# Patient Record
Sex: FEMALE | NOT HISPANIC OR LATINO | Employment: OTHER
[De-identification: names, ages, dates, MRNs, and addresses within clinical notes are randomized per-mention and may not be internally consistent; named-entity substitution may affect disease eponyms.]

---

## 2017-07-29 ENCOUNTER — HEALTH MAINTENANCE LETTER (OUTPATIENT)
Age: 62
End: 2017-07-29

## 2018-08-05 ENCOUNTER — HEALTH MAINTENANCE LETTER (OUTPATIENT)
Age: 63
End: 2018-08-05

## 2023-01-13 ENCOUNTER — APPOINTMENT (OUTPATIENT)
Dept: GENERAL RADIOLOGY | Facility: CLINIC | Age: 68
End: 2023-01-13
Attending: PHYSICIAN ASSISTANT
Payer: MEDICARE

## 2023-01-13 ENCOUNTER — APPOINTMENT (OUTPATIENT)
Dept: CT IMAGING | Facility: CLINIC | Age: 68
End: 2023-01-13
Attending: PHYSICIAN ASSISTANT
Payer: MEDICARE

## 2023-01-13 ENCOUNTER — HOSPITAL ENCOUNTER (OUTPATIENT)
Facility: CLINIC | Age: 68
Setting detail: OBSERVATION
Discharge: HOME | End: 2023-01-14
Attending: PHYSICIAN ASSISTANT | Admitting: STUDENT IN AN ORGANIZED HEALTH CARE EDUCATION/TRAINING PROGRAM
Payer: MEDICARE

## 2023-01-13 DIAGNOSIS — I48.91 NEW ONSET ATRIAL FIBRILLATION (H): ICD-10-CM

## 2023-01-13 DIAGNOSIS — R55 NEAR SYNCOPE: ICD-10-CM

## 2023-01-13 DIAGNOSIS — N39.0 UTI (URINARY TRACT INFECTION): ICD-10-CM

## 2023-01-13 DIAGNOSIS — N30.00 ACUTE CYSTITIS WITHOUT HEMATURIA: Primary | ICD-10-CM

## 2023-01-13 LAB
ALBUMIN SERPL BCG-MCNC: 3.6 G/DL (ref 3.5–5.2)
ALBUMIN UR-MCNC: NEGATIVE MG/DL
ALP SERPL-CCNC: 44 U/L (ref 35–104)
ALT SERPL W P-5'-P-CCNC: 25 U/L (ref 10–35)
ANION GAP SERPL CALCULATED.3IONS-SCNC: 10 MMOL/L (ref 7–15)
APPEARANCE UR: CLEAR
AST SERPL W P-5'-P-CCNC: 28 U/L (ref 10–35)
BACTERIA #/AREA URNS HPF: ABNORMAL /HPF
BASOPHILS # BLD AUTO: 0.1 10E3/UL (ref 0–0.2)
BASOPHILS NFR BLD AUTO: 1 %
BILIRUB SERPL-MCNC: 0.4 MG/DL
BILIRUB UR QL STRIP: NEGATIVE
BUN SERPL-MCNC: 17 MG/DL (ref 8–23)
CALCIUM SERPL-MCNC: 8.8 MG/DL (ref 8.8–10.2)
CHLORIDE SERPL-SCNC: 107 MMOL/L (ref 98–107)
COLOR UR AUTO: YELLOW
CREAT SERPL-MCNC: 0.55 MG/DL (ref 0.51–0.95)
DEPRECATED HCO3 PLAS-SCNC: 24 MMOL/L (ref 22–29)
EOSINOPHIL # BLD AUTO: 0.2 10E3/UL (ref 0–0.7)
EOSINOPHIL NFR BLD AUTO: 2 %
ERYTHROCYTE [DISTWIDTH] IN BLOOD BY AUTOMATED COUNT: 13.4 % (ref 10–15)
GFR SERPL CREATININE-BSD FRML MDRD: >90 ML/MIN/1.73M2
GLUCOSE SERPL-MCNC: 177 MG/DL (ref 70–99)
GLUCOSE UR STRIP-MCNC: NEGATIVE MG/DL
HCT VFR BLD AUTO: 37.6 % (ref 35–47)
HGB BLD-MCNC: 11.8 G/DL (ref 11.7–15.7)
HGB UR QL STRIP: NEGATIVE
IMM GRANULOCYTES # BLD: 0.1 10E3/UL
IMM GRANULOCYTES NFR BLD: 1 %
KETONES UR STRIP-MCNC: NEGATIVE MG/DL
LEUKOCYTE ESTERASE UR QL STRIP: ABNORMAL
LYMPHOCYTES # BLD AUTO: 1 10E3/UL (ref 0.8–5.3)
LYMPHOCYTES NFR BLD AUTO: 10 %
MCH RBC QN AUTO: 29 PG (ref 26.5–33)
MCHC RBC AUTO-ENTMCNC: 31.4 G/DL (ref 31.5–36.5)
MCV RBC AUTO: 92 FL (ref 78–100)
MONOCYTES # BLD AUTO: 0.6 10E3/UL (ref 0–1.3)
MONOCYTES NFR BLD AUTO: 5 %
NEUTROPHILS # BLD AUTO: 8.7 10E3/UL (ref 1.6–8.3)
NEUTROPHILS NFR BLD AUTO: 81 %
NITRATE UR QL: POSITIVE
NRBC # BLD AUTO: 0 10E3/UL
NRBC BLD AUTO-RTO: 0 /100
PH UR STRIP: 5 [PH] (ref 5–7)
PLATELET # BLD AUTO: 303 10E3/UL (ref 150–450)
POTASSIUM SERPL-SCNC: 3.7 MMOL/L (ref 3.4–5.3)
PROT SERPL-MCNC: 6.7 G/DL (ref 6.4–8.3)
RBC # BLD AUTO: 4.07 10E6/UL (ref 3.8–5.2)
RBC URINE: <1 /HPF
SODIUM SERPL-SCNC: 141 MMOL/L (ref 136–145)
SP GR UR STRIP: 1.02 (ref 1–1.03)
SQUAMOUS EPITHELIAL: 6 /HPF
T4 FREE SERPL-MCNC: 1.17 NG/DL (ref 0.9–1.7)
TROPONIN T SERPL HS-MCNC: 7 NG/L
TSH SERPL DL<=0.005 MIU/L-ACNC: 10.94 UIU/ML (ref 0.3–4.2)
UROBILINOGEN UR STRIP-MCNC: NORMAL MG/DL
WBC # BLD AUTO: 10.6 10E3/UL (ref 4–11)
WBC URINE: 16 /HPF

## 2023-01-13 PROCEDURE — 85025 COMPLETE CBC W/AUTO DIFF WBC: CPT | Performed by: PHYSICIAN ASSISTANT

## 2023-01-13 PROCEDURE — 96361 HYDRATE IV INFUSION ADD-ON: CPT

## 2023-01-13 PROCEDURE — 96365 THER/PROPH/DIAG IV INF INIT: CPT

## 2023-01-13 PROCEDURE — 99222 1ST HOSP IP/OBS MODERATE 55: CPT | Performed by: PHYSICIAN ASSISTANT

## 2023-01-13 PROCEDURE — 36415 COLL VENOUS BLD VENIPUNCTURE: CPT | Performed by: PHYSICIAN ASSISTANT

## 2023-01-13 PROCEDURE — 84443 ASSAY THYROID STIM HORMONE: CPT | Performed by: PHYSICIAN ASSISTANT

## 2023-01-13 PROCEDURE — G0378 HOSPITAL OBSERVATION PER HR: HCPCS

## 2023-01-13 PROCEDURE — 72170 X-RAY EXAM OF PELVIS: CPT

## 2023-01-13 PROCEDURE — 84484 ASSAY OF TROPONIN QUANT: CPT | Performed by: PHYSICIAN ASSISTANT

## 2023-01-13 PROCEDURE — 258N000003 HC RX IP 258 OP 636: Performed by: PHYSICIAN ASSISTANT

## 2023-01-13 PROCEDURE — 81001 URINALYSIS AUTO W/SCOPE: CPT | Performed by: PHYSICIAN ASSISTANT

## 2023-01-13 PROCEDURE — 93005 ELECTROCARDIOGRAM TRACING: CPT

## 2023-01-13 PROCEDURE — 83735 ASSAY OF MAGNESIUM: CPT | Performed by: PHYSICIAN ASSISTANT

## 2023-01-13 PROCEDURE — 80053 COMPREHEN METABOLIC PANEL: CPT | Performed by: PHYSICIAN ASSISTANT

## 2023-01-13 PROCEDURE — 70450 CT HEAD/BRAIN W/O DYE: CPT

## 2023-01-13 PROCEDURE — 250N000011 HC RX IP 250 OP 636: Performed by: PHYSICIAN ASSISTANT

## 2023-01-13 PROCEDURE — 99285 EMERGENCY DEPT VISIT HI MDM: CPT | Mod: 25

## 2023-01-13 PROCEDURE — 87186 SC STD MICRODIL/AGAR DIL: CPT | Performed by: PHYSICIAN ASSISTANT

## 2023-01-13 PROCEDURE — 84439 ASSAY OF FREE THYROXINE: CPT | Performed by: PHYSICIAN ASSISTANT

## 2023-01-13 RX ORDER — CEFTRIAXONE 1 G/1
1 INJECTION, POWDER, FOR SOLUTION INTRAMUSCULAR; INTRAVENOUS ONCE
Status: COMPLETED | OUTPATIENT
Start: 2023-01-13 | End: 2023-01-13

## 2023-01-13 RX ADMIN — SODIUM CHLORIDE 1000 ML: 9 INJECTION, SOLUTION INTRAVENOUS at 15:40

## 2023-01-13 RX ADMIN — CEFTRIAXONE 1 G: 1 INJECTION, POWDER, FOR SOLUTION INTRAMUSCULAR; INTRAVENOUS at 17:22

## 2023-01-13 ASSESSMENT — ENCOUNTER SYMPTOMS
COUGH: 0
ABDOMINAL PAIN: 0
CONFUSION: 1
SEIZURES: 0
MYALGIAS: 0
VOMITING: 0

## 2023-01-13 ASSESSMENT — ACTIVITIES OF DAILY LIVING (ADL)
ADLS_ACUITY_SCORE: 35

## 2023-01-13 NOTE — ED TRIAGE NOTES
Patient presents via EMS from home after multiple falls today. Pt has history of dementia, oriented only to self, not able to provide recall of falls today. Daughter helps with cares once per week. Pt arrives unkempt with obvious body odor. Per EMS, patient lives in a trailer home with two men, one whom she says is named Yefri and is her boyfriend. Pt does not answer when asked who the other male is. Per EMS, Yefri witnessed patient get up from chair and was aware that she fell but did not witness the fall. Denies LOC. Per EMS, patient was lying on her side holding her genitals and was visibly upset when they arrived to her home. Vitals stable on arrival. No obvious deformity or injuries noted on RN assessment.     Triage Assessment     Row Name 01/13/23 0009       Triage Assessment (Adult)    Airway WDL WDL       Respiratory WDL    Respiratory WDL WDL       Skin Circulation/Temperature WDL    Skin Circulation/Temperature WDL WDL       Cardiac WDL    Cardiac WDL WDL       Peripheral/Neurovascular WDL    Peripheral Neurovascular WDL WDL       Cognitive/Neuro/Behavioral WDL    Cognitive/Neuro/Behavioral WDL X    Level of Consciousness confused

## 2023-01-13 NOTE — ED NOTES
RN spoke with Vishnu, officer from [facility], who responded to patient's home today. Vishnu expressed concerns about patient's living situation and is investigating need for vulnerable adult protections for patient. Vishnu requested call back from social work once they have been consulted. His cell number is [phone] and will be working all weekend.

## 2023-01-13 NOTE — ED PROVIDER NOTES
History     Chief Complaint:  Fall       HPI   Angélica Limon is a 67 year old female who presents for evaluation following a fall. Angélica's daughter states that her mother was at home today when was reported by her boyfriend to have stood up then fallen unwitnessed. After he helped her up, Angélica fell one more time, thus prompting her visit today. During evaluation, Angélica's daughter denies any loss of consciousness. She does report that her mother has been more confused than baseline after the falls. Angélica denies feeling unwell prior to the fall and still denies any cough, chest pain, abdominal pain, vomiting, myalgias, alcohol use, drug use, or history of seizures. Her daughter states that Angélica seemed like she had stood up too fast then fell, but Angélica was unable to elaborate more on the situation.     Independent Historian: graciela, supplemented by daughter     Review of External Notes: I reviewed the patient's ED note from 9/13/08.      ROS:  Review of Systems   Respiratory: Negative for cough.    Cardiovascular: Negative for chest pain.   Gastrointestinal: Negative for abdominal pain and vomiting.   Musculoskeletal: Negative for myalgias.   Neurological: Negative for seizures.        (-) loss of consciousness    Psychiatric/Behavioral: Positive for confusion.   All other systems reviewed and are negative.      Allergies:  No Known Allergies     Medications:    The patient is currently on no regular medications.    Past Medical History:    Breast lump    Past Surgical History:    Right salpingo oophorectomy  Right knee arthroscopy     Family History:    Unspecified cancer - mother, sister  Eye disorder - father  Heart disease - father, mother  Unspecified respiratory issues - father    Social History:  Patient came from home via EMS.  Patient is accompanied in the ED by her daughter and other friend.  Patient reports current tobacco use.  Patient denies alcohol use.  Patient denies drug use.  PCP: Hilda Anne Ra      Physical Exam     Patient Vitals for the past 24 hrs:   BP Temp Temp src Pulse Resp SpO2   01/13/23 1815 -- -- -- 80 -- 100 %   01/13/23 1800 -- -- -- 103 -- 99 %   01/13/23 1755 -- -- -- 117 -- 99 %   01/13/23 1750 -- -- -- 93 -- 100 %   01/13/23 1745 -- -- -- 94 -- 100 %   01/13/23 1725 -- -- -- 89 -- 100 %   01/13/23 1720 -- -- -- 93 -- 100 %   01/13/23 1631 -- 98  F (36.7  C) Rectal -- -- --   01/13/23 1600 132/83 -- -- 91 -- 100 %   01/13/23 1446 107/64 -- -- 72 18 100 %        Physical Exam  General: Awake, confused.  Stool in undergarments. Discheveled appearance.  Head:  Scalp is NC/AT  Eyes:  Conjunctiva normal, PERRL  ENT:  The external nose and ears are normal.   Neck:  Normal range of motion without rigidity.  CV:  Irregularly irregular with controlled rate.    No pathologic murmur, rubs, or gallops.  Resp:  Breath sounds are clear bilaterally    Non-labored, no retractions or accessory muscle use  Abdomen: Abdomen is soft, no distension, no tenderness, no masses.  MS:  No lower extremity edema or asymmetric calf swelling. PROM of joints unremarkable  Skin:  Warm and dry, No rash or lesions noted.  Neuro: Confused.  Oriented to self and place not to time.  GCS 15 5/5 strength BL in UE and LE, normal sensation to touch.  Cranial nerves 2-12 intact.  Normal finger to nose testing  Psych:  Awake. Confused.    Emergency Department Course   ECG  ECG taken at 1634, ECG read at 1646  Atrial fibrillation  Nonspecific ST abnormality  Abnormal ECG   No prior ECG available for comparison.   Rate 86 bpm. CA interval * ms. QRS duration 68 ms. QT/QTc 362/433 ms. P-R-T axes * 69 53.     Imaging:  XR Pelvis 1/2 Views   Final Result   IMPRESSION: Single AP view the pelvis shows no evidence of an acute displaced fracture. Hip joint spaces are maintained. Bones are demineralized.      Head CT w/o contrast   Final Result   IMPRESSION:   1.  Moderate to advanced supratentorial loss and mild presumed sequela chronic  microvascular ischemic change.      2.  No finding for intracranial hemorrhage, mass, or acute infarct.         Report per radiology    Laboratory:  Labs Ordered and Resulted from Time of ED Arrival to Time of ED Departure   COMPREHENSIVE METABOLIC PANEL - Abnormal       Result Value    Sodium 141      Potassium 3.7      Chloride 107      Carbon Dioxide (CO2) 24      Anion Gap 10      Urea Nitrogen 17.0      Creatinine 0.55      Calcium 8.8      Glucose 177 (*)     Alkaline Phosphatase 44      AST 28      ALT 25      Protein Total 6.7      Albumin 3.6      Bilirubin Total 0.4      GFR Estimate >90     ROUTINE UA WITH MICROSCOPIC REFLEX TO CULTURE - Abnormal    Color Urine Yellow      Appearance Urine Clear      Glucose Urine Negative      Bilirubin Urine Negative      Ketones Urine Negative      Specific Gravity Urine 1.018      Blood Urine Negative      pH Urine 5.0      Protein Albumin Urine Negative      Urobilinogen Urine Normal      Nitrite Urine Positive (*)     Leukocyte Esterase Urine Small (*)     Bacteria Urine Few (*)     RBC Urine <1      WBC Urine 16 (*)     Squamous Epithelials Urine 6 (*)    CBC WITH PLATELETS AND DIFFERENTIAL - Abnormal    WBC Count 10.6      RBC Count 4.07      Hemoglobin 11.8      Hematocrit 37.6      MCV 92      MCH 29.0      MCHC 31.4 (*)     RDW 13.4      Platelet Count 303      % Neutrophils 81      % Lymphocytes 10      % Monocytes 5      % Eosinophils 2      % Basophils 1      % Immature Granulocytes 1      NRBCs per 100 WBC 0      Absolute Neutrophils 8.7 (*)     Absolute Lymphocytes 1.0      Absolute Monocytes 0.6      Absolute Eosinophils 0.2      Absolute Basophils 0.1      Absolute Immature Granulocytes 0.1      Absolute NRBCs 0.0     TROPONIN T, HIGH SENSITIVITY - Normal    Troponin T, High Sensitivity 7     TSH WITH FREE T4 REFLEX   URINE CULTURE        Emergency Department Course & Assessments:       Interventions:  Medications   0.9% sodium chloride BOLUS (0 mLs  Intravenous Stopped 23 171)   cefTRIAXone (ROCEPHIN) 1 g vial to attach to  mL bag for ADULTS or NS 50 mL bag for PEDS (0 g Intravenous Stopped 23)        Independent Interpretation (X-rays, CTs, rhythm strip):  I reviewed the patient's ECG, CT, and X-ray.     Consultations/Discussion of Management or Tests:   ED Course as of 23   151 I obtained history and examined the patient as noted above.     I rechecked and updated the patient.     I consulted with Stacey Terry accepting for Dr. Rodriguez of the hospitalist service and discussed patient admission. She accepted care of the patient.       Social Determinants of Health affecting care:  Patient lives with two friends and receives help with cares from daughter once per week.        Disposition:  The patient was admitted to the hospital under the care of Dr. Rodriguez.     Impression & Plan    CMS Diagnoses: None      Medical Decision Makin-year-old female who presents after several falls and presyncopal sounding episode.  Broad differential considered.  On exam is vitally stable with nonfocal neurologic exam.  CT of the head is negative.  Nothing to suggest stroke.  EKG does show new onset atrial fibrillation however rate appears to be controlled.  No definite secondary causes of this and may be chronic as has not been to a doctor in some time.  No ischemic changes troponin within normal limits patient denies cardiopulmonary symptoms and I strongly doubt ACS PE etc.  TSH mildly elevated.  Hemoglobin is normal.  Does have evidence of a UTI but does not appear septic.  Given fluids and Rocephin.  No other evidence of trauma on exam.  There were significant concerns by PD and responding EMS for vulnerable adult and patient does appear fairly poorly kept and disheveled.  Likely significant underlying dementia.  Will admit to the hospital at this time for treatment of UTI new onset A. fib social work consult  among others.  Discussed with hospitalist who agrees to accept.    Diagnosis:    ICD-10-CM    1. New onset atrial fibrillation (H)  I48.91       2. Near syncope  R55       3. UTI (urinary tract infection)  N39.0            Discharge Medications:  New Prescriptions    No medications on file        Scribe Disclosure:  I, Corine Rodriguez, am serving as a scribe at 3:14 PM on 1/13/2023 to document services personally performed by Mingo Paul PA-C based on my observations and the provider's statements to me.     1/13/2023   Mingo Paul PA-C Etten, Clark Ellsworth, PA-C  01/13/23 1906

## 2023-01-13 NOTE — ED NOTES
Bed: ED37  Expected date: 1/13/23  Expected time: 2:16 PM  Means of arrival:   Comments:  [facility] 67F

## 2023-01-14 ENCOUNTER — APPOINTMENT (OUTPATIENT)
Dept: CARDIOLOGY | Facility: CLINIC | Age: 68
End: 2023-01-14
Attending: PHYSICIAN ASSISTANT
Payer: MEDICARE

## 2023-01-14 VITALS
HEART RATE: 80 BPM | OXYGEN SATURATION: 97 % | WEIGHT: 124.7 LBS | HEIGHT: 64 IN | SYSTOLIC BLOOD PRESSURE: 105 MMHG | RESPIRATION RATE: 16 BRPM | TEMPERATURE: 98.5 F | BODY MASS INDEX: 21.29 KG/M2 | DIASTOLIC BLOOD PRESSURE: 56 MMHG

## 2023-01-14 LAB
ANION GAP SERPL CALCULATED.3IONS-SCNC: 9 MMOL/L (ref 7–15)
BUN SERPL-MCNC: 15.8 MG/DL (ref 8–23)
CALCIUM SERPL-MCNC: 8.4 MG/DL (ref 8.8–10.2)
CHLORIDE SERPL-SCNC: 113 MMOL/L (ref 98–107)
CREAT SERPL-MCNC: 0.55 MG/DL (ref 0.51–0.95)
DEPRECATED HCO3 PLAS-SCNC: 22 MMOL/L (ref 22–29)
ERYTHROCYTE [DISTWIDTH] IN BLOOD BY AUTOMATED COUNT: 13.5 % (ref 10–15)
GFR SERPL CREATININE-BSD FRML MDRD: >90 ML/MIN/1.73M2
GLUCOSE SERPL-MCNC: 93 MG/DL (ref 70–99)
HCT VFR BLD AUTO: 33.1 % (ref 35–47)
HGB BLD-MCNC: 10.6 G/DL (ref 11.7–15.7)
LVEF ECHO: NORMAL
MAGNESIUM SERPL-MCNC: 2.2 MG/DL (ref 1.7–2.3)
MCH RBC QN AUTO: 29.3 PG (ref 26.5–33)
MCHC RBC AUTO-ENTMCNC: 32 G/DL (ref 31.5–36.5)
MCV RBC AUTO: 91 FL (ref 78–100)
PLATELET # BLD AUTO: 278 10E3/UL (ref 150–450)
POTASSIUM SERPL-SCNC: 3.7 MMOL/L (ref 3.4–5.3)
RBC # BLD AUTO: 3.62 10E6/UL (ref 3.8–5.2)
SODIUM SERPL-SCNC: 144 MMOL/L (ref 136–145)
WBC # BLD AUTO: 8.1 10E3/UL (ref 4–11)

## 2023-01-14 PROCEDURE — 93306 TTE W/DOPPLER COMPLETE: CPT | Mod: 26 | Performed by: INTERNAL MEDICINE

## 2023-01-14 PROCEDURE — 250N000013 HC RX MED GY IP 250 OP 250 PS 637: Performed by: PHYSICIAN ASSISTANT

## 2023-01-14 PROCEDURE — 93306 TTE W/DOPPLER COMPLETE: CPT

## 2023-01-14 PROCEDURE — 85027 COMPLETE CBC AUTOMATED: CPT | Performed by: PHYSICIAN ASSISTANT

## 2023-01-14 PROCEDURE — G0378 HOSPITAL OBSERVATION PER HR: HCPCS

## 2023-01-14 PROCEDURE — 99239 HOSP IP/OBS DSCHRG MGMT >30: CPT | Performed by: STUDENT IN AN ORGANIZED HEALTH CARE EDUCATION/TRAINING PROGRAM

## 2023-01-14 PROCEDURE — 36415 COLL VENOUS BLD VENIPUNCTURE: CPT | Performed by: PHYSICIAN ASSISTANT

## 2023-01-14 PROCEDURE — 80048 BASIC METABOLIC PNL TOTAL CA: CPT | Performed by: PHYSICIAN ASSISTANT

## 2023-01-14 RX ORDER — CEFUROXIME AXETIL 500 MG/1
500 TABLET ORAL 2 TIMES DAILY
Qty: 8 TABLET | Refills: 0 | Status: SHIPPED | OUTPATIENT
Start: 2023-01-14 | End: 2023-01-18

## 2023-01-14 RX ORDER — METOPROLOL TARTRATE 25 MG/1
25 TABLET, FILM COATED ORAL 2 TIMES DAILY
Status: DISCONTINUED | OUTPATIENT
Start: 2023-01-14 | End: 2023-01-14

## 2023-01-14 RX ORDER — ONDANSETRON 2 MG/ML
4 INJECTION INTRAMUSCULAR; INTRAVENOUS EVERY 6 HOURS PRN
Status: DISCONTINUED | OUTPATIENT
Start: 2023-01-14 | End: 2023-01-14 | Stop reason: HOSPADM

## 2023-01-14 RX ORDER — ONDANSETRON 4 MG/1
4 TABLET, ORALLY DISINTEGRATING ORAL EVERY 6 HOURS PRN
Status: DISCONTINUED | OUTPATIENT
Start: 2023-01-14 | End: 2023-01-14 | Stop reason: HOSPADM

## 2023-01-14 RX ORDER — AMOXICILLIN 250 MG
1 CAPSULE ORAL 2 TIMES DAILY
Status: DISCONTINUED | OUTPATIENT
Start: 2023-01-14 | End: 2023-01-14 | Stop reason: HOSPADM

## 2023-01-14 RX ORDER — CEFTRIAXONE 1 G/1
1 INJECTION, POWDER, FOR SOLUTION INTRAMUSCULAR; INTRAVENOUS EVERY 24 HOURS
Status: DISCONTINUED | OUTPATIENT
Start: 2023-01-14 | End: 2023-01-14 | Stop reason: HOSPADM

## 2023-01-14 RX ORDER — ASPIRIN 81 MG/1
81 TABLET ORAL DAILY
Status: DISCONTINUED | OUTPATIENT
Start: 2023-01-14 | End: 2023-01-14 | Stop reason: HOSPADM

## 2023-01-14 RX ORDER — AMOXICILLIN 250 MG
2 CAPSULE ORAL 2 TIMES DAILY
Status: DISCONTINUED | OUTPATIENT
Start: 2023-01-14 | End: 2023-01-14 | Stop reason: HOSPADM

## 2023-01-14 RX ORDER — METOPROLOL TARTRATE 25 MG/1
12.5 TABLET, FILM COATED ORAL 2 TIMES DAILY
Qty: 15 TABLET | Refills: 0 | Status: SHIPPED | OUTPATIENT
Start: 2023-01-14

## 2023-01-14 RX ORDER — ACETAMINOPHEN 325 MG/1
650 TABLET ORAL EVERY 6 HOURS PRN
Status: DISCONTINUED | OUTPATIENT
Start: 2023-01-14 | End: 2023-01-14 | Stop reason: HOSPADM

## 2023-01-14 RX ORDER — ACETAMINOPHEN 650 MG/1
650 SUPPOSITORY RECTAL EVERY 6 HOURS PRN
Status: DISCONTINUED | OUTPATIENT
Start: 2023-01-14 | End: 2023-01-14 | Stop reason: HOSPADM

## 2023-01-14 RX ADMIN — ASPIRIN 81 MG: 81 TABLET, COATED ORAL at 09:21

## 2023-01-14 RX ADMIN — METOPROLOL TARTRATE 12.5 MG: 25 TABLET, FILM COATED ORAL at 09:21

## 2023-01-14 RX ADMIN — ASPIRIN 81 MG: 81 TABLET, COATED ORAL at 01:47

## 2023-01-14 ASSESSMENT — ACTIVITIES OF DAILY LIVING (ADL)
ADLS_ACUITY_SCORE: 21
ADLS_ACUITY_SCORE: 19
ADLS_ACUITY_SCORE: 21
ADLS_ACUITY_SCORE: 21

## 2023-01-14 NOTE — PROGRESS NOTES
ROOM # 233    Living Situation Home with partner  : Izzy (daughter)    Activity level at baseline: Ind  Activity level on admit: SBA    Who will be transporting you at discharge: Izzy    Patient registered to observation; given Patient Bill of Rights; given the opportunity to ask questions about observation status and their plan of care.  Patient has been oriented to the observation room, bathroom and call light is in place.    Discussed discharge goals and expectations with patient/family.

## 2023-01-14 NOTE — PROGRESS NOTES
PRIMARY DIAGNOSIS: Fall/UTI/New A-fib controlled  OUTPATIENT/OBSERVATION GOALS TO BE MET BEFORE DISCHARGE  1. Orthostatic performed: N/A    2. Tolerating PO medications: Yes    3. Return to near baseline physical activity: yes    4. Cleared for discharge by consultants (if involved): N/A    Discharge Planner Nurse   Safe discharge environment identified: No  Barriers to discharge: Yes       Entered by: Tricia Lama RN 01/14/2023     Please review provider order for any additional goals.   Nurse to notify provider when observation goals have been met and patient is ready for discharge.    Alert- disoriented x4. Daughter at bedside and helping answer questions. Up ambulating halls SBA. Pt. Denies pain. SR on tele- aspirin and metoprolol given per orders after pt found to be in A-fib on admit.  Echo to be done today.  IV rocephin for positive UA- UC pending. SW following d/t concerns about PTA living situation.  Possible discharge later today.

## 2023-01-14 NOTE — H&P
[facility]  Admission History and Physical Examination    NAME: Angélica Limon   : 1955   MRN: [id number]     Date of Admission:  2023    Assessment & Plan   Angélica Limon is a 67 year old female with baseline dementia/cognitive decline who was brought to ED for concerns of multiple falls at home.  Patient is a poor historian so history is obtained by speaking with the patient's daughter.  Apparently she had a fall today witnessed by her boyfriend.  When he tried to help her up she fell again. Denies LOC.  Her daughter states pt has overall had poor medical care due to lack of insurance.  She has baseline dementia for several years but no formal work up.  According to the boyfriend there has not been any recent acute illness.  Daughter did note she seems little bit more confused than baseline. But feels like pt is getting better.     Upon arrival to emergency room she is afebrile vital signs are stable she did have several episodes of tachycardia with heart rates in the 110's.  Laboratory result was fairly unremarkable with normal electrolytes normal kidney function normal LFTs.  TSH was elevated but T4 was within normal limits.  Also CBC was unremarkable.  Urinalysis did seem suspicious for UTI given positive nitrite and several bacteria CT of the head was unremarkable for acute changes.  EKG however shows new onset atrial fibrillation with controlled heart rate.     Apparently there were concerns by the police department regarding patient's living condition as she was quite disheveled and poorly kept.  However in speaking with the daughter she feels that at this point her mother is safe with the boyfriend but ultimately will be speaking with her siblings and manage her finances to see if they can send the patient to a memory care.    #Status post fall  According to daughter patient's boyfriend felt that patient lost balance and fell but cannot rule out possible tachyarrhythmia causing  dizziness  -Patient has already ambulated in the hallways without any difficulty  -We will continue work-up for A. fib, see below  -I do not think she needs PT at this point, we will have nursing staff ambulate tomorrow.  Order PT if needed  - FERNANDO clement to assist with discharge needs     #New onset rate controlled afib  Patient has evidence of atrial fibrillation, family denies any history of A. fib in the past.  She has not had any previous cardiac work-up.   Unclear duration of A. fib as she is fairly asymptomatic and is unable to provide any history.  TSH and electrolyte within normal limits.  -Monitor on telemetry  -Obtain echocardiogram  -We will start low-dose metoprolol at 12.5 mg twice daily  -John Vas2 score of 2 (age and sex) but not sure if she's the best candidate for OAC given dementia and fraility. Will start with ASA for now and await rest of cardiac work up  - Consider eventual ischemic work up with stress testing (D/w Dtr who wishes full restorative care for now)    #UTI   -UA appears to be grossly positive with positive nitrates bacteria suspect might be because of her weakness and fall   - continue Rocephin  - Await urine culture, tailor antibiotics as needed    #Dementia  -Has had cognitive decline for several years, never had any formal work-up, no history of stroke per daughter  -Did have a fairly heavy drinking history so I suspect some chronic ischemic disease contributing as well    Awaiting formal pharmacy reconciliation to resume home medications.     DVT Prophylaxis: Ambulate every shift  Code Status: Full Code  COVID PCR TESTING STATUS: Negative    Family updated with plan of care: dtr at bedside        Expected Discharge Date: 01/14/2023               Stacye Antunez PA-C    Primary Care Physician   Hilda Anne    Chief Complaint   S/p fall     History is obtained from the patient and dtr    Discussed with Dr. Paul in the ED, full chart review including lab work, imaging, and  vital signs were reviewed.     History of Present Illness   Angélica Limon is a 67 year old female with baseline dementia/cognitive decline who was brought to ED for concerns of multiple falls at home.  Patient is a poor historian so history is obtained by speaking with the patient's daughter.  Apparently she had a fall today witnessed by her boyfriend.  When he tried to help her up she fell again. Denies LOC.  Her daughter states pt has overall had poor medical care due to lack of insurance.  She has baseline dementia for several years but no formal work up.  According to the boyfriend there has not been any recent acute illness.  Daughter did note she seems little bit more confused than baseline. But feels like pt is getting better.     Upon arrival to emergency room she is afebrile vital signs are stable she did have several episodes of tachycardia with heart rates in the 110's.  Laboratory result was fairly unremarkable with normal electrolytes normal kidney function normal LFTs.  TSH was elevated but T4 was within normal limits.  Also CBC was unremarkable.  Urinalysis did seem suspicious for UTI given positive nitrite and several bacteria CT of the head was unremarkable for acute changes.  EKG however shows new onset atrial fibrillation with controlled heart rate.     Apparently there were concerns by the police department regarding patient's living condition as she was quite disheveled and poorly kept.  However in speaking with the daughter she feels that at this point her mother is safe with the boyfriend but ultimately will be speaking with her siblings and manage her finances to see if they can send the patient to a memory care.    Past Medical History    I have reviewed this patient's medical history and updated it with pertinent information if needed.   Past Medical History:   Diagnosis Date     Lump or mass in breast 1993    benign     NO ACTIVE PROBLEMS        Past Surgical History   I have reviewed this  patient's surgical history and updated it with pertinent information if needed.  Past Surgical History:   Procedure Laterality Date     SALPINGO OOPHORECTOMY,R/L/LA  2004    Right,      ZZC NONSPECIFIC PROCEDURE      arthroscopic R knee surgery       Prior to Admission Medications   None     Allergies   No Known Allergies    Social History   I have reviewed this patient's social history and updated it with pertinent information if needed. Angélica iLmon  reports that she has been smoking cigarettes. She has been smoking an average of .5 packs per day. She has never used smokeless tobacco. She reports current alcohol use. She reports that she does not use drugs.    Family History   I have reviewed this patient's family history and updated it with pertinent information if needed.   Family History   Problem Relation Age of Onset     Heart Disease Mother         MI age 60's     Cancer Mother         thyroid     Heart Disease Father         MI age 40     Respiratory Father         emphesema     Eye Disorder Father         macular degeneration     Breast Cancer Paternal Aunt      Cancer Sister         thyroid     Cancer Grandchild 18        thyroid       Review of Systems   The 10 point Review of Systems is negative other than noted in the HPI or here.     Physical Exam   Temp: 98  F (36.7  C) Temp src: Rectal BP: 118/69 Pulse: 98   Resp: 18 SpO2: 99 % O2 Device: None (Room air)    Vital Signs with Ranges  Temp:  [98  F (36.7  C)] 98  F (36.7  C)  Pulse:  [] 98  Resp:  [18] 18  BP: (107-132)/(64-83) 118/69  SpO2:  [97 %-100 %] 99 %  0 lbs 0 oz    Constitutional: Awake, alert,  no apparent distress. Frail appearing, speech scattered   Eyes: Conjunctiva and pupils examined and normal.  HEENT: Dry mucous membranes, poor dentition.  Respiratory: Clear to auscultation bilaterally, no crackles or wheezing.  Cardiovascular: irreg irreg, normal S1 and S2, and no murmur noted.  GI: Soft, non-distended, non-tender, bowel  sounds present. No rebound tenderness or guarding.  Lymph/Hematologic: No anterior cervical or supraclavicular adenopathy.  Skin: No rashes, no cyanosis, no edema.  Musculoskeletal: No deformities noted.  No erythema or tenderness. Moving all extremities.  Neurologic: No focal deficits noted. Speech is clear. Coordination and strength grossly normal.   Psychiatric: Appropriate affect.    Data   Data reviewed today:      EKG: afib, rate controlled.   Imaging:   Recent Results (from the past 24 hour(s))   Head CT w/o contrast    Narrative    EXAM: CT HEAD W/O CONTRAST  LOCATION: [facility]  DATE/TIME: 1/13/2023 4:50 PM    INDICATION: AMS, fall.  COMPARISON: None.  TECHNIQUE: Routine CT Head without IV contrast. Multiplanar reformats. Dose reduction techniques were used.    FINDINGS:  INTRACRANIAL CONTENTS: No finding for intracranial hemorrhage, mass, or acute infarct.    Moderate prominence of the lateral ventricles and sulci. Mild presumed sequela of chronic microvascular ischemic change. No transcortical areas low attenuation change. No mass effect or midline shift.    Cerebellar tonsils are normally positioned. Sella is unremarkable for technique. Corpus callosum is normally formed.    VISUALIZED ORBITS/SINUSES/MASTOIDS: Orbits are unremarkable. Mild ethmoid and maxillary sinus mucosal thickening. Middle ear cavities and mastoid air cells are clear. Moderate cerumen both EACs.    BONES/SOFT TISSUES: Calvarium is intact, without fracture or suspicious lytic or blastic foci. No scalp hematoma.      Impression    IMPRESSION:  1.  Moderate to advanced supratentorial loss and mild presumed sequela chronic microvascular ischemic change.    2.  No finding for intracranial hemorrhage, mass, or acute infarct.   XR Pelvis 1/2 Views    Narrative    EXAM: XR PELVIS 1/2 VIEWS  LOCATION: [facility]  DATE/TIME: 1/13/2023 5:09 PM    INDICATION: Fall.  hip pain  COMPARISON: None.       Impression    IMPRESSION: Single AP view the pelvis shows no evidence of an acute displaced fracture. Hip joint spaces are maintained. Bones are demineralized.       Recent Labs   Lab 01/13/23  1541   WBC 10.6   HGB 11.8   MCV 92         POTASSIUM 3.7   CHLORIDE 107   CO2 24   BUN 17.0   CR 0.55   ANIONGAP 10   SKYE 8.8   *   ALBUMIN 3.6   PROTTOTAL 6.7   BILITOTAL 0.4   ALKPHOS 44   ALT 25   AST 28       Stacey Antunez PA-C  [facility]  January 13, 2023  Securely message with the Vocera Web Console (learn more here)  Text page via [facility] Paging/Directory

## 2023-01-14 NOTE — PHARMACY-ADMISSION MEDICATION HISTORY
Admission medication history interview status for this patient is complete. See [facility] admission navigator for allergy information, prior to admission medications and immunization status.     Medication history interview done, indicate source(s): Patient and Family  Medication history resources (including written lists, pill bottles, clinic record):None  Pharmacy: -    Changes made to PTA medication list:  Added: -  Changed: -  Reported as Not Taking: -  Removed: aspirin, peridex, pen V    Actions taken by pharmacist (provider contacted, etc):None     Additional medication history information:None    Medication reconciliation/reorder completed by provider prior to medication history?  no   (Y/N)     For patients on insulin therapy:   Do you use sliding scale insulin based on blood sugars?   What is your pre-meal insulin coverage?    Do you typically eat three meals a day?   How many times do you check your blood glucose per day?   How many episodes of hypoglycemia do you typically have per month?   Do you have a Continuous Glucose Monitor (CGM)?      Prior to Admission medications    Not on File
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
## 2023-01-14 NOTE — PROGRESS NOTES
"PRIMARY DIAGNOSIS: Fall  OUTPATIENT/OBSERVATION GOALS TO BE MET BEFORE DISCHARGE  1. Orthostatic performed: N/A    2. Tolerating PO medications: Yes    3. Return to near baseline physical activity: No    4. Cleared for discharge by consultants (if involved): No    Discharge Planner Nurse   Safe discharge environment identified: No  Barriers to discharge: Yes       Entered by: Faviola Sorenson RN 01/14/2023     /69 (BP Location: Right arm)   Pulse 92   Temp 98  F (36.7  C) (Oral)   Resp 16   Ht 1.632 m (5' 4.25\")   Wt 56.6 kg (124 lb 11.2 oz)   SpO2 97%   BMI 21.24 kg/m    Pt is alert and oriented to self only. VSS on RA. Pt denies any pain or dizziness. Regular diet. EKG in ER found new onset of A-fib.  Tele -SR 70's. Pt pulled out IV and does not want another one put in. Consult CW/SW  Please review provider order for any additional goals.   Nurse to notify provider when observation goals have been met and patient is ready for discharge.  "

## 2023-01-14 NOTE — ED NOTES
[facility]  ED Nurse Handoff Report    Angélica Limon is a 67 year old female   ED Chief complaint: Fall  . ED Diagnosis:   Final diagnoses:   New onset atrial fibrillation (H)   Near syncope   UTI (urinary tract infection)     Allergies: No Known Allergies    Code Status: Full Code  Activity level - Baseline/Home:  Stand by Assist. Activity Level - Current:   Assist X 1. Lift room needed: No. Bariatric: No   Needed: No   Isolation: No. Infection: Not Applicable.     Vital Signs:   Vitals:    01/13/23 1725 01/13/23 1745 01/13/23 1750 01/13/23 1755   BP:       Pulse: 89 94 93 117   Resp:       Temp:       TempSrc:       SpO2: 100% 100% 100% 99%       Cardiac Rhythm:  ,      Pain level:    Patient confused: Yes. Patient Falls Risk: Yes.   Elimination Status: Patient knows when she has to void. Hx of dementia, unable to use call light so pt will attempt to get up on own to go.    Patient Report - Initial Complaint: Falls.   Focused Assessment:  Angélica Limon is a 67 year old female who presents for evaluation following a fall. Angélica's daughter states that her mother was at home today when was reported by her boyfriend to have stood up then fallen unwitnessed. After he helped her up, Angélica fell one more time, thus prompting her visit today. During evaluation, Angélica's daughter denies any loss of consciousness. She does report that her mother has been more confused than baseline after the falls. Angélica denies feeling unwell prior to the fall and still denies any cough, chest pain, abdominal pain, vomiting, myalgias, alcohol use, drug use, or history of seizures. Her daughter states that Angélica seemed like she had stood up too fast then fell, but Angélica was unable to elaborate more on the situation.   Independent Historian: no, supplemented by daughter   Tests Performed: Labs, EKG, imaging.   Abnormal Results:   Labs Ordered and Resulted from Time of ED Arrival to Time of ED Departure   COMPREHENSIVE  METABOLIC PANEL - Abnormal       Result Value    Sodium 141      Potassium 3.7      Chloride 107      Carbon Dioxide (CO2) 24      Anion Gap 10      Urea Nitrogen 17.0      Creatinine 0.55      Calcium 8.8      Glucose 177 (*)     Alkaline Phosphatase 44      AST 28      ALT 25      Protein Total 6.7      Albumin 3.6      Bilirubin Total 0.4      GFR Estimate >90     ROUTINE UA WITH MICROSCOPIC REFLEX TO CULTURE - Abnormal    Color Urine Yellow      Appearance Urine Clear      Glucose Urine Negative      Bilirubin Urine Negative      Ketones Urine Negative      Specific Gravity Urine 1.018      Blood Urine Negative      pH Urine 5.0      Protein Albumin Urine Negative      Urobilinogen Urine Normal      Nitrite Urine Positive (*)     Leukocyte Esterase Urine Small (*)     Bacteria Urine Few (*)     RBC Urine <1      WBC Urine 16 (*)     Squamous Epithelials Urine 6 (*)    CBC WITH PLATELETS AND DIFFERENTIAL - Abnormal    WBC Count 10.6      RBC Count 4.07      Hemoglobin 11.8      Hematocrit 37.6      MCV 92      MCH 29.0      MCHC 31.4 (*)     RDW 13.4      Platelet Count 303      % Neutrophils 81      % Lymphocytes 10      % Monocytes 5      % Eosinophils 2      % Basophils 1      % Immature Granulocytes 1      NRBCs per 100 WBC 0      Absolute Neutrophils 8.7 (*)     Absolute Lymphocytes 1.0      Absolute Monocytes 0.6      Absolute Eosinophils 0.2      Absolute Basophils 0.1      Absolute Immature Granulocytes 0.1      Absolute NRBCs 0.0     TROPONIN T, HIGH SENSITIVITY - Normal    Troponin T, High Sensitivity 7     TSH WITH FREE T4 REFLEX   URINE CULTURE     XR Pelvis 1/2 Views   Final Result   IMPRESSION: Single AP view the pelvis shows no evidence of an acute displaced fracture. Hip joint spaces are maintained. Bones are demineralized.      Head CT w/o contrast   Final Result   IMPRESSION:   1.  Moderate to advanced supratentorial loss and mild presumed sequela chronic microvascular ischemic change.      2.   No finding for intracranial hemorrhage, mass, or acute infarct.         Treatments provided: See MAR  Family Comments: Daughter at bedside with patient. SW consult in place d/t concern for current living situation.  OBS brochure/video discussed/provided to patient:  Yes  ED Medications:   Medications   0.9% sodium chloride BOLUS (0 mLs Intravenous Stopped 1/13/23 1715)   cefTRIAXone (ROCEPHIN) 1 g vial to attach to  mL bag for ADULTS or NS 50 mL bag for PEDS (0 g Intravenous Stopped 1/13/23 1752)     Drips infusing:  No  For the majority of the shift, the patient's behavior Green. Interventions performed were Frequent rounding/safety checks.    Sepsis treatment initiated: No     Patient tested for COVID 19 prior to admission: NO - not ordered    ED Nurse Name/Phone Number: Sonia Montesinos RN,    6:13 PM    RECEIVING UNIT ED HANDOFF REVIEW    Above ED Nurse Handoff Report was reviewed: Yes  Reviewed by: Faviola Sorenson RN on January 13, 2023 at 11:49 PM

## 2023-01-14 NOTE — ED PROVIDER NOTES
Emergency Department Attending Supervision Note  1/13/2023  7:29 PM      I evaluated this patient in conjunction with Mingo Paul PA-C      Briefly, the patient presented with multiple falls and AMD.  Hx of dementia. At time of my evaluation, patient has no complaints.  Family present.      Exam:  Gen    MDM and Plan:  Multiple falls and near syncope- new diagnosis of a-fib.  Rate controlled here.  Will need tele.  Trop neg and denies CP.    UTI- ceftriaxone given    Complex social situation- reportedly quite disheveled upon arrival, will likely need SW consult      Diagnosis    ICD-10-CM    1. New onset atrial fibrillation (H)  I48.91       2. Near syncope  R55       3. UTI (urinary tract infection)  N39.0                Estefanía Clayton MD  01/13/23 1950

## 2023-01-14 NOTE — DISCHARGE SUMMARY
[facility]  Discharge Summary  Name: Angélica Limon    MRN: [id number]  YOB: 1955    Age: 67 year old  Date of Discharge:  1/14/2023  Date of Admission: 1/13/2023  Primary Care Provider: Hilda Anne Ra  Discharge Physician:  Godfrey Rodriguez DO  Discharging Service:  Hospitalist      Hospital Course  Angélica Limon is a 67 year old female with baseline dementia/cognitive decline who was brought to ED for concerns of multiple falls at home.    Upon arrival to emergency room she is afebrile vital signs are stable she did have several episodes of tachycardia with heart rates in the 110's.  Laboratory result was fairly unremarkable with normal electrolytes normal kidney function normal LFTs.  TSH was elevated but T4 was within normal limits.  Also CBC was unremarkable.  Urinalysis did seem suspicious for UTI given positive nitrite and several bacteria CT of the head was unremarkable for acute changes.  EKG however shows new onset atrial fibrillation with controlled heart rate.  The patient was initiated on ceftriaxone and admitted to observation status.    By the following day, she was up and walking independently without any concerns reported by nursing staff.  There is reportedly some concern with living situation and so I discussed the case with the patient and her daughter.  Daughter does endorse that the house is a bit disheveled and could be  but she has no concerns about her mom returning home with her boyfriend who is reportedly there 24/7.  She does note that the are working on getting into memory care unit.  Social work also assessed the situation given concern reported by police.  On the day of discharge, she was discharged in stable condition with safe plan.      Discharge Diagnoses:  Status post fall  According to daughter patient's boyfriend felt that patient lost balance and fell but cannot rule out possible tachyarrhythmia causing dizziness.  Patient ambulated in the  hallways without any difficulty.  SW eval to assist with discharge needs .     New onset rate controlled afib  Patient has evidence of atrial fibrillation, family denies any history of A. fib in the past.  She has not had any previous cardiac work-up.  Unclear duration of A. fib as she is fairly asymptomatic and is unable to provide any history.  TSH and electrolyte within normal limits.  TTE is not concerning.  Normal ejection fraction.  John Vas2 score of 2 (age and sex) but not sure if she's the best candidate for OAC given dementia and fraility.  We will start low-dose metoprolol at 12.5 mg twice daily.  She is normal sinus rhythm on the day of discharge.  Consider eventual ischemic work up with stress testing (D/w Dtr who wishes full restorative care for now).     UTI   UA appears to be grossly positive with positive nitrates bacteria suspect might be because of her weakness and fall.she received Rocephin on admission.  Will transition to cefuroxime on discharge.  Urine culture is growing greater than 100,000 CFUs of gram-negative bacteria.       Dementia  Has had cognitive decline for several years, never had any formal work-up, no history of stroke per daughter.  Did have a fairly heavy drinking history so I suspect some chronic ischemic disease contributing as well.  I have recommended formal evaluation by PCP as well as a neurologist after discharge.  Daughter and patient are aware.       Discharge Disposition:  Discharged to home    Allergies:  No Known Allergies     Discharge Medications:        Review of your medicines      START taking      Dose / Directions   cefuroxime 500 MG tablet  Commonly known as: CEFTIN  Used for: Acute cystitis without hematuria      Dose: 500 mg  Take 1 tablet (500 mg) by mouth 2 times daily for 4 days  Quantity: 8 tablet  Refills: 0     metoprolol tartrate 25 MG tablet  Commonly known as: LOPRESSOR  Used for: New onset atrial fibrillation (H)      Dose: 12.5 mg  Take 0.5  "tablets (12.5 mg) by mouth 2 times daily  Quantity: 15 tablet  Refills: 0           Where to get your medicines      These medications were sent to [facility] [place], [place] - [address] [address]  [address] [facility] [address]    Phone: [phone]     cefuroxime 500 MG tablet    metoprolol tartrate 25 MG tablet         Condition on Discharge:  Discharge condition: Stable       Code status on discharge: Full Code     History of Illness:  See detailed admission note for full details.    Physical Exam:  Vital signs:  Temp: 98.4  F (36.9  C) Temp src: Oral BP: 130/74 Pulse: 74   Resp: 16 SpO2: 98 % O2 Device: None (Room air)   Height: 163.2 cm (5' 4.25\") Weight: 56.6 kg (124 lb 11.2 oz)  Estimated body mass index is 21.24 kg/m  as calculated from the following:    Height as of this encounter: 1.632 m (5' 4.25\").    Weight as of this encounter: 56.6 kg (124 lb 11.2 oz).    Wt Readings from Last 1 Encounters:   01/14/23 56.6 kg (124 lb 11.2 oz)     General: Alert, awake, no acute distress.  HEENT: NC/AT, eyes anicteric, external occular movements intact, face symmetric.    Cardiac: RRR, S1, S2.  No murmurs appreciated.  Pulmonary: Normal chest rise, normal work of breathing.  Lungs CTA BL  Abdomen: soft, non-tender, non-distended.  Bowel Sounds Present.  No guarding.  Extremities: no deformities.  Warm, well perfused.  Skin: no rashes or lesions noted.  Warm and Dry.  Neuro: No focal deficits noted.  Speech clear.  Coordination and strength grossly normal.  Somewhat confused.  Looks to her daughter to answer many questions.  Poor historian.  Moving all extremities without difficulty.  Psych: Appropriate affect.    Procedures other than Imaging:  None     Imaging:  Recent Results (from the past 24 hour(s))   Head CT w/o contrast    Narrative    EXAM: CT HEAD W/O CONTRAST  LOCATION: [facility]  DATE/TIME: 1/13/2023 4:50 PM    INDICATION: AMS, fall.  COMPARISON: " None.  TECHNIQUE: Routine CT Head without IV contrast. Multiplanar reformats. Dose reduction techniques were used.    FINDINGS:  INTRACRANIAL CONTENTS: No finding for intracranial hemorrhage, mass, or acute infarct.    Moderate prominence of the lateral ventricles and sulci. Mild presumed sequela of chronic microvascular ischemic change. No transcortical areas low attenuation change. No mass effect or midline shift.    Cerebellar tonsils are normally positioned. Sella is unremarkable for technique. Corpus callosum is normally formed.    VISUALIZED ORBITS/SINUSES/MASTOIDS: Orbits are unremarkable. Mild ethmoid and maxillary sinus mucosal thickening. Middle ear cavities and mastoid air cells are clear. Moderate cerumen both EACs.    BONES/SOFT TISSUES: Calvarium is intact, without fracture or suspicious lytic or blastic foci. No scalp hematoma.      Impression    IMPRESSION:  1.  Moderate to advanced supratentorial loss and mild presumed sequela chronic microvascular ischemic change.    2.  No finding for intracranial hemorrhage, mass, or acute infarct.   XR Pelvis 1/2 Views    Narrative    EXAM: XR PELVIS 1/2 VIEWS  LOCATION: [facility]  DATE/TIME: 2023 5:09 PM    INDICATION: Fall.  hip pain  COMPARISON: None.      Impression    IMPRESSION: Single AP view the pelvis shows no evidence of an acute displaced fracture. Hip joint spaces are maintained. Bones are demineralized.   Echocardiogram Complete   Result Value    LVEF  55-60%    Narrative    [id number]  [id number]  [id number]  [id number]^MARLINE^NURA^ARSLAN     [facility]  Echocardiography Laboratory  [address] [place] [address]     Name: CASSIDY URBINA  MRN: [id number]  : 1955  Study Date: 2023 08:29 AM  Age: 67 yrs  Gender: Female  Patient Location: [facility]  Reason For Study: Atrial Fibrillation  Ordering Physician: NURA HORAN  Performed By: Bonny Mireles     BSA: 1.6 m2  Height: 64 in  Weight:  124 lb  HR: 95  BP: 111/67 mmHg  ______________________________________________________________________________  Procedure  Complete Portable Echo Adult.  ______________________________________________________________________________  Interpretation Summary     Sinus rhythm was noted.  Left ventricular systolic function is normal.  The visual ejection fraction is 55-60%.  The right ventricle is normal in structure, function and size.  Normal left atrial size.  Doppler interrogation does not demonstrate signficant stenosis or  insufficieicy involving cardiac vaves     No old studies for comparison  ______________________________________________________________________________  Left Ventricle  The left ventricle is normal in structure, function and size. There is normal  left ventricular wall thickness. Left ventricular systolic function is normal.  The visual ejection fraction is 55-60%. Left ventricular diastolic function is  normal. Normal left ventricular wall motion. There is no thrombus seen in the  left ventricle.     Right Ventricle  The right ventricle is normal in structure, function and size. There is no  mass or thrombus in the right ventricle.     Atria  Normal left atrial size. Right atrial size is normal. There is no atrial shunt  seen. The left atrial appendage is not well visualized.     Mitral Valve  The mitral valve leaflets appear normal. There is no evidence of stenosis,  fluttering, or prolapse. There is no evidence of mitral valve prolapse. There  is no mitral regurgitation noted. There is no mitral valve stenosis.     Tricuspid Valve  Normal tricuspid valve. The right ventricular systolic pressure is  approximated at 30.5 mmHg plus the right atrial pressure. Right ventricle  systolic pressure estimate normal. There is mild (1+) tricuspid regurgitation.  There is no tricuspid stenosis.     Aortic Valve  The aortic valve is normal in structure and function. No aortic regurgitation  is present. No  aortic stenosis is present.     Pulmonic Valve  The pulmonic valve is not well seen, but is grossly normal. There is no  pulmonic valvular regurgitation. There is no pulmonic valvular stenosis.     Vessels  The aortic root is normal size. Normal size ascending aorta. The inferior vena  cava is normal. The pulmonary artery is normal size.     Pericardium  The pericardium appears normal. There is no pleural effusion.     Rhythm  Sinus rhythm was noted.  ______________________________________________________________________________  MMode/2D Measurements & Calculations     IVSd: 1.1 cm  LVIDd: 4.1 cm  LVIDs: 3.1 cm  LVPWd: 0.94 cm  FS: 25.4 %  LV mass(C)d: 133.4 grams  LV mass(C)dI: 83.6 grams/m2  Ao root diam: 2.7 cm  LA dimension: 4.2 cm  LA/Ao: 1.6  LVOT diam: 2.4 cm  LVOT area: 4.5 cm2  LA Volume (BP): 65.1 ml  LA Volume Index (BP): 40.7 ml/m2  RWT: 0.46     Doppler Measurements & Calculations  MV E max joseph: 74.5 cm/sec  MV A max joseph: 45.6 cm/sec  MV E/A: 1.6  MV dec time: 0.17 sec  LV V1 max P.1 mmHg  LV V1 max: 101.5 cm/sec  LV V1 VTI: 15.3 cm  SV(LVOT): 69.0 ml  SI(LVOT): 43.2 ml/m2  PA acc time: 0.14 sec  TR max joseph: 276.0 cm/sec  TR max P.5 mmHg  E/E' av.3  Lateral E/e': 5.9  Medial E/e': 6.7     ______________________________________________________________________________  Report approved by: Dr. Mayco Chapa 2023 01:52 PM                Consultations:  No consultations were requested during this admission.       Recent Lab Results:  Recent Labs   Lab 23  0617 23  1541   WBC 8.1 10.6   HGB 10.6* 11.8   HCT 33.1* 37.6   MCV 91 92    303          Lab Results   Component Value Date     2023     2023     2006    Lab Results   Component Value Date    CHLORIDE 113 2023    CHLORIDE 107 2023    CHLORIDE 110 2006    Lab Results   Component Value Date    BUN 15.8 2023    BUN 17.0 2023    BUN 16 2006       Lab Results   Component Value Date    POTASSIUM 3.7 01/14/2023    POTASSIUM 3.7 01/13/2023    POTASSIUM 3.8 11/24/2006    Lab Results   Component Value Date    CO2 22 01/14/2023    CO2 24 01/13/2023    CO2 26 11/24/2006    Lab Results   Component Value Date    CR 0.55 01/14/2023    CR 0.55 01/13/2023    CR 0.63 11/24/2006             Pending Results:    Unresulted Labs Ordered in the Past 30 Days of this Admission     Date and Time Order Name Status Description    1/13/2023  4:45 PM Urine Culture In process            Discharge Instructions and Follow-Up:   Discharge Procedure Orders   Reason for your hospital stay   Order Comments: You were admitted for weakness. You were found to have a urinary tract infection. Please complete the entire course of antibiotics.     Follow-up and recommended labs and tests    Order Comments: Follow up with primary care provider, Hilda Anne, within 7 days for hospital follow- up.  The following labs/tests are recommended: CBC, BMP.     Activity   Order Comments: Your activity upon discharge: activity as tolerated     Order Specific Question Answer Comments   Is discharge order? Yes      Diet   Order Comments: Follow this diet upon discharge:       Regular Diet Adult     Order Specific Question Answer Comments   Is discharge order? Yes        Total time spent in face to face contact with the patient and coordinating discharge was:  >30 Minutes.

## 2023-01-14 NOTE — PLAN OF CARE
Patient's After Visit Summary was reviewed with patient and/or family.   Patient verbalized understanding of After Visit Summary, recommended follow up and was given an opportunity to ask questions.   Discharge medications sent home with patient/family: YES   Discharged with daughter    OBSERVATION patient END time: 1300

## 2023-01-15 LAB
BACTERIA UR CULT: ABNORMAL
BACTERIA UR CULT: ABNORMAL

## 2023-01-16 LAB
ATRIAL RATE - MUSE: 88 BPM
DIASTOLIC BLOOD PRESSURE - MUSE: NORMAL MMHG
INTERPRETATION ECG - MUSE: NORMAL
P AXIS - MUSE: NORMAL DEGREES
PR INTERVAL - MUSE: NORMAL MS
QRS DURATION - MUSE: 68 MS
QT - MUSE: 362 MS
QTC - MUSE: 433 MS
R AXIS - MUSE: 69 DEGREES
SYSTOLIC BLOOD PRESSURE - MUSE: NORMAL MMHG
T AXIS - MUSE: 53 DEGREES
VENTRICULAR RATE- MUSE: 86 BPM

## 2023-04-15 ENCOUNTER — HEALTH MAINTENANCE LETTER (OUTPATIENT)
Age: 68
End: 2023-04-15

## 2024-06-22 ENCOUNTER — HEALTH MAINTENANCE LETTER (OUTPATIENT)
Age: 69
End: 2024-06-22

## 2024-07-27 ENCOUNTER — LAB REQUISITION (OUTPATIENT)
Dept: LAB | Facility: CLINIC | Age: 69
End: 2024-07-27
Payer: COMMERCIAL

## 2024-07-27 DIAGNOSIS — R60.9 EDEMA, UNSPECIFIED: ICD-10-CM

## 2024-07-29 LAB
ANION GAP SERPL CALCULATED.3IONS-SCNC: 10 MMOL/L (ref 7–15)
BUN SERPL-MCNC: 16.5 MG/DL (ref 8–23)
CALCIUM SERPL-MCNC: 9.2 MG/DL (ref 8.8–10.4)
CHLORIDE SERPL-SCNC: 108 MMOL/L (ref 98–107)
CREAT SERPL-MCNC: 0.55 MG/DL (ref 0.51–0.95)
EGFRCR SERPLBLD CKD-EPI 2021: >90 ML/MIN/1.73M2
ERYTHROCYTE [DISTWIDTH] IN BLOOD BY AUTOMATED COUNT: 14 % (ref 10–15)
GLUCOSE SERPL-MCNC: 76 MG/DL (ref 70–99)
HCO3 SERPL-SCNC: 26 MMOL/L (ref 22–29)
HCT VFR BLD AUTO: 37.3 % (ref 35–47)
HGB BLD-MCNC: 11.6 G/DL (ref 11.7–15.7)
MCH RBC QN AUTO: 29.3 PG (ref 26.5–33)
MCHC RBC AUTO-ENTMCNC: 31.1 G/DL (ref 31.5–36.5)
MCV RBC AUTO: 94 FL (ref 78–100)
PLATELET # BLD AUTO: 379 10E3/UL (ref 150–450)
POTASSIUM SERPL-SCNC: 4.2 MMOL/L (ref 3.4–5.3)
RBC # BLD AUTO: 3.96 10E6/UL (ref 3.8–5.2)
SODIUM SERPL-SCNC: 144 MMOL/L (ref 135–145)
WBC # BLD AUTO: 6.4 10E3/UL (ref 4–11)

## 2024-07-29 PROCEDURE — P9603 ONE-WAY ALLOW PRORATED MILES: HCPCS | Mod: ORL | Performed by: FAMILY MEDICINE

## 2024-07-29 PROCEDURE — 36415 COLL VENOUS BLD VENIPUNCTURE: CPT | Mod: ORL | Performed by: FAMILY MEDICINE

## 2024-07-29 PROCEDURE — 80048 BASIC METABOLIC PNL TOTAL CA: CPT | Mod: ORL | Performed by: FAMILY MEDICINE

## 2024-07-29 PROCEDURE — 85027 COMPLETE CBC AUTOMATED: CPT | Mod: ORL | Performed by: FAMILY MEDICINE

## 2025-07-12 ENCOUNTER — HEALTH MAINTENANCE LETTER (OUTPATIENT)
Age: 70
End: 2025-07-12